# Patient Record
Sex: MALE | Race: WHITE | Employment: FULL TIME | URBAN - METROPOLITAN AREA
[De-identification: names, ages, dates, MRNs, and addresses within clinical notes are randomized per-mention and may not be internally consistent; named-entity substitution may affect disease eponyms.]

---

## 2018-08-19 ENCOUNTER — HOSPITAL ENCOUNTER (EMERGENCY)
Age: 28
Discharge: HOME | End: 2018-08-20
Attending: EMERGENCY MEDICINE
Payer: COMMERCIAL

## 2018-08-19 ENCOUNTER — APPOINTMENT (OUTPATIENT)
Dept: CT IMAGING | Age: 28
End: 2018-08-19
Attending: EMERGENCY MEDICINE
Payer: COMMERCIAL

## 2018-08-19 ENCOUNTER — APPOINTMENT (OUTPATIENT)
Dept: GENERAL RADIOLOGY | Age: 28
End: 2018-08-19
Attending: EMERGENCY MEDICINE
Payer: COMMERCIAL

## 2018-08-19 DIAGNOSIS — S22.41XA CLOSED FRACTURE OF MULTIPLE RIBS OF RIGHT SIDE, INITIAL ENCOUNTER: Primary | ICD-10-CM

## 2018-08-19 DIAGNOSIS — S27.321A CONTUSION OF RIGHT LUNG, INITIAL ENCOUNTER: ICD-10-CM

## 2018-08-19 LAB
ALBUMIN SERPL-MCNC: 4.6 G/DL (ref 3.5–5)
ALBUMIN/GLOB SERPL: 1.3 {RATIO} (ref 1.2–3.5)
ALP SERPL-CCNC: 83 U/L (ref 50–136)
ALT SERPL-CCNC: 50 U/L (ref 12–65)
ANION GAP SERPL CALC-SCNC: 11 MMOL/L (ref 7–16)
AST SERPL-CCNC: 40 U/L (ref 15–37)
BASOPHILS # BLD: 0 K/UL
BASOPHILS NFR BLD: 0 % (ref 0–2)
BILIRUB SERPL-MCNC: 0.8 MG/DL (ref 0.2–1.1)
BUN SERPL-MCNC: 20 MG/DL (ref 6–23)
CALCIUM SERPL-MCNC: 9 MG/DL (ref 8.3–10.4)
CHLORIDE SERPL-SCNC: 101 MMOL/L (ref 98–107)
CO2 SERPL-SCNC: 28 MMOL/L (ref 21–32)
CREAT SERPL-MCNC: 1.12 MG/DL (ref 0.8–1.5)
DIFFERENTIAL METHOD BLD: NORMAL
EOSINOPHIL # BLD: 0.4 K/UL
EOSINOPHIL NFR BLD: 4 % (ref 0.5–7.8)
ERYTHROCYTE [DISTWIDTH] IN BLOOD BY AUTOMATED COUNT: 12.2 %
GLOBULIN SER CALC-MCNC: 3.6 G/DL (ref 2.3–3.5)
GLUCOSE SERPL-MCNC: 89 MG/DL (ref 65–100)
HCT VFR BLD AUTO: 41.8 % (ref 41.1–50.3)
HGB BLD-MCNC: 14.4 G/DL (ref 13.6–17.2)
IMM GRANULOCYTES # BLD: 0 K/UL
IMM GRANULOCYTES NFR BLD AUTO: 0 % (ref 0–5)
LYMPHOCYTES # BLD: 2.9 K/UL
LYMPHOCYTES NFR BLD: 28 % (ref 13–44)
MCH RBC QN AUTO: 30.6 PG (ref 26.1–32.9)
MCHC RBC AUTO-ENTMCNC: 34.4 G/DL (ref 31.4–35)
MCV RBC AUTO: 88.9 FL (ref 79.6–97.8)
MONOCYTES # BLD: 1 K/UL
MONOCYTES NFR BLD: 9 % (ref 4–12)
NEUTS SEG # BLD: 6 K/UL
NEUTS SEG NFR BLD: 58 % (ref 43–78)
NRBC # BLD: 0 K/UL (ref 0–0.2)
PLATELET # BLD AUTO: 323 K/UL (ref 150–450)
PMV BLD AUTO: 9.8 FL (ref 9.4–12.3)
POTASSIUM SERPL-SCNC: 3.7 MMOL/L (ref 3.5–5.1)
PROT SERPL-MCNC: 8.2 G/DL (ref 6.3–8.2)
RBC # BLD AUTO: 4.7 M/UL (ref 4.23–5.6)
SODIUM SERPL-SCNC: 140 MMOL/L (ref 136–145)
WBC # BLD AUTO: 10.4 K/UL (ref 4.3–11.1)

## 2018-08-19 PROCEDURE — 71046 X-RAY EXAM CHEST 2 VIEWS: CPT

## 2018-08-19 PROCEDURE — 74011000258 HC RX REV CODE- 258: Performed by: EMERGENCY MEDICINE

## 2018-08-19 PROCEDURE — 74011636320 HC RX REV CODE- 636/320: Performed by: EMERGENCY MEDICINE

## 2018-08-19 PROCEDURE — 73030 X-RAY EXAM OF SHOULDER: CPT

## 2018-08-19 PROCEDURE — 85025 COMPLETE CBC W/AUTO DIFF WBC: CPT

## 2018-08-19 PROCEDURE — 99284 EMERGENCY DEPT VISIT MOD MDM: CPT | Performed by: EMERGENCY MEDICINE

## 2018-08-19 PROCEDURE — 93005 ELECTROCARDIOGRAM TRACING: CPT | Performed by: EMERGENCY MEDICINE

## 2018-08-19 PROCEDURE — 80053 COMPREHEN METABOLIC PANEL: CPT

## 2018-08-19 PROCEDURE — 71260 CT THORAX DX C+: CPT

## 2018-08-19 RX ORDER — SODIUM CHLORIDE 0.9 % (FLUSH) 0.9 %
10 SYRINGE (ML) INJECTION
Status: COMPLETED | OUTPATIENT
Start: 2018-08-19 | End: 2018-08-19

## 2018-08-19 RX ADMIN — SODIUM CHLORIDE 100 ML: 900 INJECTION, SOLUTION INTRAVENOUS at 23:50

## 2018-08-19 RX ADMIN — Medication 10 ML: at 23:50

## 2018-08-19 RX ADMIN — IOPAMIDOL 100 ML: 755 INJECTION, SOLUTION INTRAVENOUS at 23:50

## 2018-08-20 VITALS
RESPIRATION RATE: 18 BRPM | HEIGHT: 72 IN | DIASTOLIC BLOOD PRESSURE: 82 MMHG | SYSTOLIC BLOOD PRESSURE: 142 MMHG | WEIGHT: 228 LBS | OXYGEN SATURATION: 97 % | HEART RATE: 85 BPM | TEMPERATURE: 98.7 F | BODY MASS INDEX: 30.88 KG/M2

## 2018-08-20 LAB
ATRIAL RATE: 93 BPM
CALCULATED P AXIS, ECG09: 70 DEGREES
CALCULATED R AXIS, ECG10: 71 DEGREES
CALCULATED T AXIS, ECG11: 50 DEGREES
DIAGNOSIS, 93000: NORMAL
P-R INTERVAL, ECG05: 128 MS
Q-T INTERVAL, ECG07: 338 MS
QRS DURATION, ECG06: 88 MS
QTC CALCULATION (BEZET), ECG08: 420 MS
VENTRICULAR RATE, ECG03: 93 BPM

## 2018-08-20 RX ORDER — HYDROCODONE BITARTRATE AND ACETAMINOPHEN 5; 325 MG/1; MG/1
1 TABLET ORAL
Qty: 20 TAB | Refills: 0 | Status: SHIPPED | OUTPATIENT
Start: 2018-08-20
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
## 2020-09-15 PROBLEM — S42.022A CLOSED DISPLACED FRACTURE OF SHAFT OF LEFT CLAVICLE: Status: ACTIVE | Noted: 2020-09-15

## 2020-09-15 NOTE — BRIEF OP NOTE
BRIEF OPERATIVE NOTE    Date of Procedure: 9/17/2020     Preoperative Diagnosis:  CLOSED, COMMINUTED, DISPLACED MIDSHAFT LEFT CLAVICLE FRACTURE    Postoperative Diagnosis:  SAME    Procedure(s): OPEN REDUCTION, INTERNAL FIXATION LEFT CLAVICLE FRACTURE    Surgeon(s) and Role:     * Jen Corral MD - Primary         Assistant Staff:  Samara Banda NP      Surgical Staff:  Circ-1: (Unknown)  Scrub Tech-1: (Unknown)  Scrub Tech-2: (Unknown)  Scrub Tech-3: (Unknown)  No case tracking events are documented in the log. Anesthesia:  GENERAL WITH INTERSCALENE BLOCK    Estimated Blood Loss: 50 cc. Complications: NONE    Implants:   Implant Name Type Inv.  Item Serial No.  Lot No. LRB No. Used Action   PLATE BNE [id number] 8 H L SUP CLAV S STL ELISA COMPR FOR 3.5MM - [id number]  PLATE BNE [id number] 8 H L SUP CLAV S STL ELISA COMPR FOR 3.5MM [id number] [employer] [place]_ [id number] Left 1 Implanted       Rica Rice MD

## 2020-09-15 NOTE — H&P
[facility] HISTORY AND PHYSICAL    Subjective:     Patient is a 26 y.o. RHD MALE WITH LEFT SHOULDER PAIN. SEE OFFICE NOTE. Patient Active Problem List    Diagnosis Date Noted    Closed displaced fracture of shaft of left clavicle 09/15/2020    Bicipital tendinitis of right shoulder 12/15/2016    Degenerative joint disease of right acromioclavicular joint 12/15/2016    Osteoarthritis of right glenohumeral joint 12/15/2016    SLAP lesion of right shoulder 12/15/2016    Elevated BP 05/10/2015     Past Medical History:   Diagnosis Date    Adverse effect of anesthesia     \"told possible poss sleep apnea after last surgery\"    Chronic pain     fx R patella    Degenerative joint disease of right acromioclavicular joint 12/15/2016    Obesity (BMI 30.0-34.9) 5/7/15    BMI- 30.4    Snores       Past Surgical History:   Procedure Laterality Date    HX ORTHOPAEDIC Left     fx wrist- later had wires taken out    HX ORTHOPAEDIC Left     labrium    HX ORTHOPAEDIC Right     thumb tendons hand    HX ORTHOPAEDIC Right 07/08/2016    REMOVAL OF DEEP HARDWARE/RIGHT EXCISION HETEROTROPIC OSSIFICATION  RIGHT PATELLA  OSTEOTOMY. Prior to Admission medications    Medication Sig Start Date End Date Taking? Authorizing Provider   HYDROcodone-acetaminophen (NORCO) 5-325 mg per tablet Take 1 Tab by mouth every four (4) hours as needed for Pain. Max Daily Amount: 6 Tabs. 8/20/18   Neno Mccullough MD   diclofenac (VOLTAREN) 1 % gel Apply  to affected area as needed. Provider, Historical     No Known Allergies   Social History     Tobacco Use    Smoking status: Never Smoker    Smokeless tobacco: Never Used   Substance Use Topics    Alcohol use: Yes     Alcohol/week: 5.0 standard drinks     Types: 5 Cans of beer per week      No family history on file. Review of Systems  A comprehensive review of systems was negative except for that written in the HPI. Objective:     No data found.   There were no vitals taken for this visit. General:  Alert, cooperative, no distress, appears stated age. Head:  Normocephalic, without obvious abnormality, atraumatic. Back:   Symmetric, no curvature. ROM normal. No CVA tenderness. Lungs:   Clear to auscultation bilaterally. Chest wall:  No tenderness or deformity. Heart:  Regular rate and rhythm, S1, S2 normal, no murmur, click, rub or gallop. Extremities: Extremities normal, atraumatic, no cyanosis or edema. Pulses: 2+ and symmetric all extremities. Skin: Skin color, texture, turgor normal. No rashes or lesions   Lymph nodes: Cervical, supraclavicular, and axillary nodes normal.   Neurologic: CNII-XII intact. Normal strength, sensation and reflexes throughout. Assessment:     Principal Problem:    Closed displaced fracture of shaft of left clavicle (9/15/2020)        Plan:     The various methods of treatment have been discussed with the patient and family. PATIENT HAS EXHAUSTED NON-OPERATIVE MODALITIES     After consideration of risks, benefits and other options for treatment, the patient has consented to surgical intervention.     SEE OFFICE NOTE    Marcie Garcia MD

## 2020-09-16 ENCOUNTER — HOSPITAL ENCOUNTER (OUTPATIENT)
Dept: SURGERY | Age: 30
Discharge: HOME | End: 2020-09-16

## 2020-09-16 ENCOUNTER — ANESTHESIA EVENT (OUTPATIENT)
Dept: SURGERY | Age: 30
End: 2020-09-16
Payer: COMMERCIAL

## 2020-09-16 VITALS — HEIGHT: 72 IN | BODY MASS INDEX: 30.48 KG/M2 | WEIGHT: 225 LBS

## 2020-09-16 RX ORDER — TRAMADOL HYDROCHLORIDE 50 MG/1
50 TABLET ORAL
COMMUNITY

## 2020-09-16 RX ORDER — IBUPROFEN 800 MG/1
800 TABLET ORAL 2 TIMES DAILY
COMMUNITY

## 2020-09-16 NOTE — PERIOP NOTES
The patient and family have been mailed the information provided by [facility] in regards to resuming surgery during COVID-19. The patient and family have been informed of [facility] procedures to minimize COVID-19 related risk, but that elimination of risk is not possible. The patient and family have been informed of the visitation policy during their surgery - that one visitor is allowed to be with the patient. The patient has been advised to maintain the stay at home order for two weeks prior and two weeks after surgery. The patient and family have been educated to monitor symptoms such as fever, cough (dry or productive), shortness of breath, difficulty breathing, sore throat, laryngitis, headache, fatigue, unexplained soreness, diarrhea, nausea and sudden loss of taste or smell. They have been advised if the patient develops any of these symptoms, they are to contact our office to review and possibly reschedule their surgery. This was discussed in the office with the patient on 9/15/20.

## 2020-09-16 NOTE — PERIOP NOTES
Patient verified name and . Order for consent was found in EHR and matches case posting; patient verifies procedure. Type 1b surgery, PAT phone assessment complete. Orders not received. Labs per surgeon: none  Labs per anesthesia protocol: none      Patient answered medical/surgical history questions at their best of ability. All prior to admission medications documented in [facility]. Patient instructed to take the following medications the day of surgery according to anesthesia guidelines with a small sip of water:Tramadol if needed Hold all vitamins 7 days prior to surgery and NSAIDS 5 days prior to surgery. Prescription meds to hold:Ibuprofen    Patient instructed on the following:    > a negative Covid swab result is required to proceed with surgery;  appointments are made by the surgeon office and test should be collected 7 days prior to surgery. The testing center is located at the [address] [place] [address].   > 1 visitor allowed at this time. >Arrive at [facility], time of arrival to be called the day before by 1700  >NPO after midnight including gum, mints, and ice chips  >Responsible adult must drive patient to the hospital, stay during surgery, and patient will need supervision 24 hours after anesthesia  >Use antibacterial soap in shower the night before surgery and Hibiclens packet provided by Dr. Dhara Quan office on the morning of surgery  >All piercings must be removed prior to arrival.    >Leave all valuables (money and jewelry) at home but bring insurance card and ID on       DOS. >Do not wear make-up, nail polish, lotions, cologne, perfumes, powders, or oil on skin.

## 2020-09-17 ENCOUNTER — ANESTHESIA (OUTPATIENT)
Dept: SURGERY | Age: 30
End: 2020-09-17
Payer: COMMERCIAL

## 2020-09-17 ENCOUNTER — APPOINTMENT (OUTPATIENT)
Dept: GENERAL RADIOLOGY | Age: 30
End: 2020-09-17
Attending: ORTHOPAEDIC SURGERY
Payer: COMMERCIAL

## 2020-09-17 ENCOUNTER — HOSPITAL ENCOUNTER (OUTPATIENT)
Age: 30
Setting detail: OUTPATIENT SURGERY
Discharge: HOME | End: 2020-09-17
Attending: ORTHOPAEDIC SURGERY | Admitting: ORTHOPAEDIC SURGERY
Payer: COMMERCIAL

## 2020-09-17 VITALS
HEART RATE: 88 BPM | BODY MASS INDEX: 31.04 KG/M2 | RESPIRATION RATE: 16 BRPM | SYSTOLIC BLOOD PRESSURE: 132 MMHG | DIASTOLIC BLOOD PRESSURE: 64 MMHG | OXYGEN SATURATION: 94 % | WEIGHT: 229.19 LBS | TEMPERATURE: 98.3 F | HEIGHT: 72 IN

## 2020-09-17 PROCEDURE — C1713 ANCHOR/SCREW BN/BN,TIS/BN: HCPCS | Performed by: ORTHOPAEDIC SURGERY

## 2020-09-17 PROCEDURE — 76010000171 HC OR TIME 2 TO 2.5 HR INTENSV-TIER 1: Performed by: ORTHOPAEDIC SURGERY

## 2020-09-17 PROCEDURE — 77030040361 HC SLV COMPR DVT MDII -B: Performed by: ORTHOPAEDIC SURGERY

## 2020-09-17 PROCEDURE — 74011000250 HC RX REV CODE- 250: Performed by: ANESTHESIOLOGY

## 2020-09-17 PROCEDURE — 77030040922 HC BLNKT HYPOTHRM STRY -A: Performed by: ANESTHESIOLOGY

## 2020-09-17 PROCEDURE — 77030025102 HC BIT DRL QC SS 2 SYNT -B: Performed by: ORTHOPAEDIC SURGERY

## 2020-09-17 PROCEDURE — A4565 SLINGS: HCPCS | Performed by: ORTHOPAEDIC SURGERY

## 2020-09-17 PROCEDURE — 77030003862 HC BIT DRL SYNT -B: Performed by: ORTHOPAEDIC SURGERY

## 2020-09-17 PROCEDURE — 74011250636 HC RX REV CODE- 250/636: Performed by: ANESTHESIOLOGY

## 2020-09-17 PROCEDURE — 76942 ECHO GUIDE FOR BIOPSY: CPT | Performed by: ORTHOPAEDIC SURGERY

## 2020-09-17 PROCEDURE — 74011000250 HC RX REV CODE- 250: Performed by: NURSE ANESTHETIST, CERTIFIED REGISTERED

## 2020-09-17 PROCEDURE — 76210000020 HC REC RM PH II FIRST 0.5 HR: Performed by: ORTHOPAEDIC SURGERY

## 2020-09-17 PROCEDURE — 77030014058 HC TIP IRR PULSVC ZIMM -A: Performed by: ORTHOPAEDIC SURGERY

## 2020-09-17 PROCEDURE — 76060000035 HC ANESTHESIA 2 TO 2.5 HR: Performed by: ORTHOPAEDIC SURGERY

## 2020-09-17 PROCEDURE — 77030003602 HC NDL NRV BLK BBMI -B: Performed by: ANESTHESIOLOGY

## 2020-09-17 PROCEDURE — 74011250636 HC RX REV CODE- 250/636: Performed by: NURSE ANESTHETIST, CERTIFIED REGISTERED

## 2020-09-17 PROCEDURE — 77030029637: Performed by: ORTHOPAEDIC SURGERY

## 2020-09-17 PROCEDURE — 2709999900 HC NON-CHARGEABLE SUPPLY: Performed by: ORTHOPAEDIC SURGERY

## 2020-09-17 PROCEDURE — 74011250637 HC RX REV CODE- 250/637: Performed by: ANESTHESIOLOGY

## 2020-09-17 PROCEDURE — 77030013107 HC CUF CRYOCUF DJOR -B: Performed by: ORTHOPAEDIC SURGERY

## 2020-09-17 PROCEDURE — 77030002986 HC SUT PROL J&J -A: Performed by: ORTHOPAEDIC SURGERY

## 2020-09-17 PROCEDURE — 77030039425 HC BLD LARYNG TRULITE DISP TELE -A: Performed by: ANESTHESIOLOGY

## 2020-09-17 PROCEDURE — 77030013708 HC HNDPC SUC IRR PULS STRY –B: Performed by: ORTHOPAEDIC SURGERY

## 2020-09-17 PROCEDURE — 77030031139 HC SUT VCRL2 J&J -A: Performed by: ORTHOPAEDIC SURGERY

## 2020-09-17 PROCEDURE — 76210000063 HC OR PH I REC FIRST 0.5 HR: Performed by: ORTHOPAEDIC SURGERY

## 2020-09-17 PROCEDURE — 77030020268 HC MISC GENERAL SUPPLY: Performed by: ORTHOPAEDIC SURGERY

## 2020-09-17 PROCEDURE — 73000 X-RAY EXAM OF COLLAR BONE: CPT

## 2020-09-17 PROCEDURE — 77030020163 HC SEAL HEMSTAT HALY -B: Performed by: ORTHOPAEDIC SURGERY

## 2020-09-17 PROCEDURE — 77030037088 HC TUBE ENDOTRACH ORAL NSL COVD-A: Performed by: ANESTHESIOLOGY

## 2020-09-17 PROCEDURE — 76010010054 HC POST OP PAIN BLOCK: Performed by: ORTHOPAEDIC SURGERY

## 2020-09-17 PROCEDURE — 74011250636 HC RX REV CODE- 250/636: Performed by: NURSE PRACTITIONER

## 2020-09-17 PROCEDURE — 77030011283 HC ELECTRD NDL COVD -A: Performed by: ORTHOPAEDIC SURGERY

## 2020-09-17 DEVICE — SCREW BNE L22MM DIA3.5MM CORT S STL ST NONCANNULATED LOK: Type: IMPLANTABLE DEVICE | Site: CLAVICLE | Status: FUNCTIONAL

## 2020-09-17 DEVICE — SCREW BNE L18MM DIA3.5MM CORT S STL ST LOK FULL THRD: Type: IMPLANTABLE DEVICE | Site: CLAVICLE | Status: FUNCTIONAL

## 2020-09-17 DEVICE — IMPLANTABLE DEVICE: Type: IMPLANTABLE DEVICE | Site: CLAVICLE | Status: FUNCTIONAL

## 2020-09-17 DEVICE — SCREW BNE L20MM DIA3.5MM CORT S STL ST NONCANNULATED LOK: Type: IMPLANTABLE DEVICE | Site: CLAVICLE | Status: FUNCTIONAL

## 2020-09-17 DEVICE — SCREW BNE L20MM DIA3.5MM CORT S STL ST LOK FULL THRD: Type: IMPLANTABLE DEVICE | Site: CLAVICLE | Status: FUNCTIONAL

## 2020-09-17 RX ORDER — BUPIVACAINE HYDROCHLORIDE AND EPINEPHRINE 5; 5 MG/ML; UG/ML
INJECTION, SOLUTION EPIDURAL; INTRACAUDAL; PERINEURAL
Status: COMPLETED | OUTPATIENT
Start: 2020-09-17 | End: 2020-09-17

## 2020-09-17 RX ORDER — HALOPERIDOL 5 MG/ML
1 INJECTION INTRAMUSCULAR
Status: DISCONTINUED | OUTPATIENT
Start: 2020-09-17 | End: 2020-09-17 | Stop reason: HOSPADM

## 2020-09-17 RX ORDER — FENTANYL CITRATE 50 UG/ML
100 INJECTION, SOLUTION INTRAMUSCULAR; INTRAVENOUS ONCE
Status: COMPLETED | OUTPATIENT
Start: 2020-09-17 | End: 2020-09-17

## 2020-09-17 RX ORDER — PROPOFOL 10 MG/ML
INJECTION, EMULSION INTRAVENOUS AS NEEDED
Status: DISCONTINUED | OUTPATIENT
Start: 2020-09-17 | End: 2020-09-17 | Stop reason: HOSPADM

## 2020-09-17 RX ORDER — LIDOCAINE HYDROCHLORIDE 20 MG/ML
INJECTION, SOLUTION EPIDURAL; INFILTRATION; INTRACAUDAL; PERINEURAL AS NEEDED
Status: DISCONTINUED | OUTPATIENT
Start: 2020-09-17 | End: 2020-09-17 | Stop reason: HOSPADM

## 2020-09-17 RX ORDER — CEFAZOLIN SODIUM/WATER 2 G/20 ML
2 SYRINGE (ML) INTRAVENOUS ONCE
Status: COMPLETED | OUTPATIENT
Start: 2020-09-17 | End: 2020-09-17

## 2020-09-17 RX ORDER — NALOXONE HYDROCHLORIDE 0.4 MG/ML
0.2 INJECTION, SOLUTION INTRAMUSCULAR; INTRAVENOUS; SUBCUTANEOUS AS NEEDED
Status: DISCONTINUED | OUTPATIENT
Start: 2020-09-17 | End: 2020-09-17 | Stop reason: HOSPADM

## 2020-09-17 RX ORDER — MIDAZOLAM HYDROCHLORIDE 1 MG/ML
2 INJECTION, SOLUTION INTRAMUSCULAR; INTRAVENOUS ONCE
Status: COMPLETED | OUTPATIENT
Start: 2020-09-17 | End: 2020-09-17

## 2020-09-17 RX ORDER — MIDAZOLAM HYDROCHLORIDE 1 MG/ML
2 INJECTION, SOLUTION INTRAMUSCULAR; INTRAVENOUS
Status: DISCONTINUED | OUTPATIENT
Start: 2020-09-17 | End: 2020-09-17 | Stop reason: HOSPADM

## 2020-09-17 RX ORDER — NALOXONE HYDROCHLORIDE 0.4 MG/ML
0.2 INJECTION, SOLUTION INTRAMUSCULAR; INTRAVENOUS; SUBCUTANEOUS
Status: DISCONTINUED | OUTPATIENT
Start: 2020-09-17 | End: 2020-09-17 | Stop reason: HOSPADM

## 2020-09-17 RX ORDER — SODIUM CHLORIDE 0.9 % (FLUSH) 0.9 %
5-40 SYRINGE (ML) INJECTION EVERY 8 HOURS
Status: DISCONTINUED | OUTPATIENT
Start: 2020-09-17 | End: 2020-09-17 | Stop reason: HOSPADM

## 2020-09-17 RX ORDER — SODIUM CHLORIDE, SODIUM LACTATE, POTASSIUM CHLORIDE, CALCIUM CHLORIDE 600; 310; 30; 20 MG/100ML; MG/100ML; MG/100ML; MG/100ML
75 INJECTION, SOLUTION INTRAVENOUS CONTINUOUS
Status: DISCONTINUED | OUTPATIENT
Start: 2020-09-17 | End: 2020-09-17 | Stop reason: HOSPADM

## 2020-09-17 RX ORDER — SODIUM CHLORIDE, SODIUM LACTATE, POTASSIUM CHLORIDE, CALCIUM CHLORIDE 600; 310; 30; 20 MG/100ML; MG/100ML; MG/100ML; MG/100ML
25 INJECTION, SOLUTION INTRAVENOUS CONTINUOUS
Status: DISCONTINUED | OUTPATIENT
Start: 2020-09-17 | End: 2020-09-17 | Stop reason: HOSPADM

## 2020-09-17 RX ORDER — DEXAMETHASONE SODIUM PHOSPHATE 4 MG/ML
INJECTION, SOLUTION INTRA-ARTICULAR; INTRALESIONAL; INTRAMUSCULAR; INTRAVENOUS; SOFT TISSUE
Status: COMPLETED | OUTPATIENT
Start: 2020-09-17 | End: 2020-09-17

## 2020-09-17 RX ORDER — HYDROMORPHONE HYDROCHLORIDE 2 MG/ML
0.5 INJECTION, SOLUTION INTRAMUSCULAR; INTRAVENOUS; SUBCUTANEOUS
Status: DISCONTINUED | OUTPATIENT
Start: 2020-09-17 | End: 2020-09-17 | Stop reason: HOSPADM

## 2020-09-17 RX ORDER — SUCCINYLCHOLINE CHLORIDE 20 MG/ML
INJECTION INTRAMUSCULAR; INTRAVENOUS AS NEEDED
Status: DISCONTINUED | OUTPATIENT
Start: 2020-09-17 | End: 2020-09-17 | Stop reason: HOSPADM

## 2020-09-17 RX ORDER — ACETAMINOPHEN 500 MG
1000 TABLET ORAL ONCE
Status: COMPLETED | OUTPATIENT
Start: 2020-09-17 | End: 2020-09-17

## 2020-09-17 RX ORDER — LIDOCAINE HYDROCHLORIDE 10 MG/ML
0.1 INJECTION INFILTRATION; PERINEURAL AS NEEDED
Status: DISCONTINUED | OUTPATIENT
Start: 2020-09-17 | End: 2020-09-17 | Stop reason: HOSPADM

## 2020-09-17 RX ORDER — SODIUM CHLORIDE 0.9 % (FLUSH) 0.9 %
5-40 SYRINGE (ML) INJECTION AS NEEDED
Status: DISCONTINUED | OUTPATIENT
Start: 2020-09-17 | End: 2020-09-17 | Stop reason: HOSPADM

## 2020-09-17 RX ORDER — ONDANSETRON 2 MG/ML
INJECTION INTRAMUSCULAR; INTRAVENOUS AS NEEDED
Status: DISCONTINUED | OUTPATIENT
Start: 2020-09-17 | End: 2020-09-17 | Stop reason: HOSPADM

## 2020-09-17 RX ORDER — OXYCODONE HYDROCHLORIDE 5 MG/1
5 TABLET ORAL
Status: DISCONTINUED | OUTPATIENT
Start: 2020-09-17 | End: 2020-09-17 | Stop reason: HOSPADM

## 2020-09-17 RX ORDER — ROCURONIUM BROMIDE 10 MG/ML
INJECTION, SOLUTION INTRAVENOUS AS NEEDED
Status: DISCONTINUED | OUTPATIENT
Start: 2020-09-17 | End: 2020-09-17 | Stop reason: HOSPADM

## 2020-09-17 RX ORDER — ONDANSETRON 2 MG/ML
4 INJECTION INTRAMUSCULAR; INTRAVENOUS
Status: DISCONTINUED | OUTPATIENT
Start: 2020-09-17 | End: 2020-09-17 | Stop reason: HOSPADM

## 2020-09-17 RX ADMIN — LIDOCAINE HYDROCHLORIDE 0.1 ML: 10 INJECTION, SOLUTION INFILTRATION; PERINEURAL at 05:40

## 2020-09-17 RX ADMIN — ONDANSETRON 4 MG: 2 INJECTION INTRAMUSCULAR; INTRAVENOUS at 08:27

## 2020-09-17 RX ADMIN — FENTANYL CITRATE 100 MCG: 50 INJECTION INTRAMUSCULAR; INTRAVENOUS at 06:35

## 2020-09-17 RX ADMIN — ROCURONIUM BROMIDE 10 MG: 10 INJECTION, SOLUTION INTRAVENOUS at 06:55

## 2020-09-17 RX ADMIN — SUCCINYLCHOLINE CHLORIDE 180 MG: 20 INJECTION, SOLUTION INTRAMUSCULAR; INTRAVENOUS at 06:55

## 2020-09-17 RX ADMIN — DEXAMETHASONE SODIUM PHOSPHATE 5 MG: 4 INJECTION, SOLUTION INTRAMUSCULAR; INTRAVENOUS at 06:37

## 2020-09-17 RX ADMIN — PROPOFOL 250 MG: 10 INJECTION, EMULSION INTRAVENOUS at 06:55

## 2020-09-17 RX ADMIN — BUPIVACAINE HYDROCHLORIDE AND EPINEPHRINE 20 ML: 5; 5 INJECTION, SOLUTION EPIDURAL; INTRACAUDAL; PERINEURAL at 06:37

## 2020-09-17 RX ADMIN — ACETAMINOPHEN 1000 MG: 500 TABLET, FILM COATED ORAL at 05:36

## 2020-09-17 RX ADMIN — LIDOCAINE HYDROCHLORIDE 100 MG: 20 INJECTION, SOLUTION EPIDURAL; INFILTRATION; INTRACAUDAL; PERINEURAL at 06:55

## 2020-09-17 RX ADMIN — SODIUM CHLORIDE, SODIUM LACTATE, POTASSIUM CHLORIDE, AND CALCIUM CHLORIDE 25 ML/HR: 600; 310; 30; 20 INJECTION, SOLUTION INTRAVENOUS at 05:40

## 2020-09-17 RX ADMIN — Medication 2 G: at 07:05

## 2020-09-17 RX ADMIN — MIDAZOLAM 2 MG: 1 INJECTION INTRAMUSCULAR; INTRAVENOUS at 06:35

## 2020-09-17 NOTE — H&P
Date of Surgery Update:  Yahir Louis was seen and examined. History and physical has been reviewed. The patient has been examined.  There have been no significant clinical changes since the completion of the originally dated History and Physical.    Signed By: Abi Mason MD     September 17, 2020 6:30 AM

## 2020-09-17 NOTE — ANESTHESIA POSTPROCEDURE EVALUATION
Procedure(s):  OPEN REDUCTION, INTERNAL FIXATION LEFT CLAVICLE FRACTURE.     general    Anesthesia Post Evaluation      Multimodal analgesia: multimodal analgesia used between 6 hours prior to anesthesia start to PACU discharge  Patient location during evaluation: PACU  Patient participation: complete - patient participated  Level of consciousness: awake and awake and alert  Pain management: adequate  Airway patency: patent  Anesthetic complications: no  Cardiovascular status: acceptable  Respiratory status: acceptable  Hydration status: acceptable  Post anesthesia nausea and vomiting:  controlled      INITIAL Post-op Vital signs:   Vitals Value Taken Time   /58 9/17/2020  9:10 AM   Temp 36.8 °C (98.3 °F) 9/17/2020  9:03 AM   Pulse 96 9/17/2020  9:10 AM   Resp 16 9/17/2020  9:10 AM   SpO2 94 % 9/17/2020  9:10 AM

## 2020-09-17 NOTE — ANESTHESIA PREPROCEDURE EVALUATION
Anesthetic History   No history of anesthetic complications            Review of Systems / Medical History  Patient summary reviewed and pertinent labs reviewed    Pulmonary  Within defined limits                 Neuro/Psych   Within defined limits           Cardiovascular                  Exercise tolerance: >4 METS  Comments: Denies CV history   GI/Hepatic/Renal  Within defined limits              Endo/Other        Obesity and arthritis     Other Findings              Physical Exam    Airway  Mallampati: II  TM Distance: 4 - 6 cm  Neck ROM: normal range of motion   Mouth opening: Normal     Cardiovascular    Rhythm: regular  Rate: normal         Dental  No notable dental hx       Pulmonary  Breath sounds clear to auscultation               Abdominal  GI exam deferred       Other Findings            Anesthetic Plan    ASA: 2  Anesthesia type: general      Post-op pain plan if not by surgeon: peripheral nerve block single    Induction: Intravenous  Anesthetic plan and risks discussed with: Patient

## 2020-09-17 NOTE — DISCHARGE INSTRUCTIONS
Follow instructions given by Dr. Lenin Paulino office, they will call you tomorrow. Call their office with any questions or concerns. ACTIVITY  · As tolerated and as directed by your doctor. · Bathe or shower as directed by your doctor. DIET  · Clear liquids until no nausea or vomiting; then light diet for the first day. · Advance to regular diet on second day, unless your doctor orders otherwise. · If nausea and vomiting continues, call your doctor. PAIN  · Take pain medication as directed by your doctor. · Call your doctor if pain is NOT relieved by medication. · DO NOT take aspirin of blood thinners unless directed by your doctor. CALL YOUR DOCTOR IF   · Excessive bleeding that does not stop after holding pressure over the area  · Temperature of 101 degrees F or above  · Excessive redness, swelling or bruising, and/ or green or yellow, smelly discharge from incision    AFTER ANESTHESIA   · For the first 24 hours: DO NOT Drive, Drink alcoholic beverages, or Make important decisions. · Be aware of dizziness following anesthesia and while taking pain medication. After general anesthesia or intravenous sedation, for 24 hours or while taking prescription Narcotics:  · Limit your activities  · A responsible adult needs to be with you for the next 24 hours  · Do not drive and operate hazardous machinery  · Do not make important personal or business decisions  · Do not drink alcoholic beverages  · If you have not urinated within 8 hours after discharge, please contact your surgeon on call. · If you have sleep apnea and have a CPAP machine, please use it for all naps and sleeping. · Please use caution when taking narcotics and any of your home medications that may cause drowsiness. *  Please give a list of your current medications to your Primary Care Provider.   *  Please update this list whenever your medications are discontinued, doses are      changed, or new medications (including over-the-counter products) are added. *  Please carry medication information at all times in case of emergency situations. These are general instructions for a healthy lifestyle:  No smoking/ No tobacco products/ Avoid exposure to second hand smoke  Surgeon General's Warning:  Quitting smoking now greatly reduces serious risk to your health. Obesity, smoking, and sedentary lifestyle greatly increases your risk for illness  A healthy diet, regular physical exercise & weight monitoring are important for maintaining a healthy lifestyle    You may be retaining fluid if you have a history of heart failure or if you experience any of the following symptoms:  Weight gain of 3 pounds or more overnight or 5 pounds in a week, increased swelling in our hands or feet or shortness of breath while lying flat in bed. Please call your doctor as soon as you notice any of these symptoms; do not wait until your next office visit.
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
## 2020-09-17 NOTE — ANESTHESIA PROCEDURE NOTES
Peripheral Block    Start time: 9/17/2020 6:34 AM  End time: 9/17/2020 6:37 AM  Performed by: Ruby Mcbride MD  Authorized by: Ruby Mcbride MD       Pre-procedure: Indications: at surgeon's request and post-op pain management    Preanesthetic Checklist: patient identified, risks and benefits discussed, site marked, timeout performed, anesthesia consent given and patient being monitored    Timeout Time: 06:34          Block Type:   Block Type:   Interscalene  Laterality:  Left  Monitoring:  Standard ASA monitoring, responsive to questions, continuous pulse ox, oxygen, frequent vital sign checks and heart rate  Injection Technique:  Single shot  Procedures: ultrasound guided and nerve stimulator    Patient Position: supine  Prep: chlorhexidine    Location:  Interscalene  Needle Type:  Stimuplex  Needle Gauge:  22 G  Needle Localization:  Nerve stimulator and ultrasound guidance  Motor Response: minimal motor response >0.4 mA      Assessment:  Number of attempts:  1  Injection Assessment:  Incremental injection every 5 mL, negative aspiration for CSF, ultrasound image on chart, no paresthesia, local visualized surrounding nerve on ultrasound, negative aspiration for blood and no intravascular symptoms  Patient tolerance:  Patient tolerated the procedure well with no immediate complications

## 2022-03-20 PROBLEM — S42.022A CLOSED DISPLACED FRACTURE OF SHAFT OF LEFT CLAVICLE: Status: ACTIVE | Noted: 2020-09-15

## 2022-09-15 ENCOUNTER — TELEPHONE (OUTPATIENT)
Dept: ORTHOPEDIC SURGERY | Age: 32
End: 2022-09-15

## 2022-09-15 NOTE — TELEPHONE ENCOUNTER
Called and spoke with patient. Patient got in sooner elsewhere. Informed the patient to call our office if need anything further.
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
## 2025-07-09 ENCOUNTER — TELEPHONE (OUTPATIENT)
Dept: ORTHOPEDIC SURGERY | Age: 35
End: 2025-07-09

## 2025-07-10 ENCOUNTER — TELEPHONE (OUTPATIENT)
Dept: ORTHOPEDIC SURGERY | Age: 35
End: 2025-07-10

## (undated) DEVICE — HIGH CAPACITY FAN SPRAY TIP WITH SHIELD: Brand: PULSAVAC®

## (undated) DEVICE — SLING ARM SWTH UNIV FOAM 1 SZ FITS MOST

## (undated) DEVICE — TOTAL SHOULDER DR POSTA: Brand: MEDLINE INDUSTRIES, INC.

## (undated) DEVICE — XBRAID #5

## (undated) DEVICE — DRAPE SHT 3 QTR PROXIMA 53X77 --

## (undated) DEVICE — 3M™ STERI-DRAPE™ INCISE DRAPE 1050 (60CM X 45CM): Brand: STERI-DRAPE™

## (undated) DEVICE — HANDPIECE SET WITH COAXIAL HIGH FLOW TIP AND SUCTION TUBE: Brand: INTERPULSE

## (undated) DEVICE — CUFF THER CRYO UNIV 32-48 IN FOR 81-122CM CHST CIRC AIRCAST

## (undated) DEVICE — DRAPE,TOP,102X53,STERILE: Brand: MEDLINE

## (undated) DEVICE — SUTURE VCRL SZ 0 L27IN ABSRB UD L36MM CP-1 1/2 CIR REV CUT J267H

## (undated) DEVICE — BUTTON SWITCH PENCIL BLADE ELECTRODE, HOLSTER: Brand: EDGE

## (undated) DEVICE — XBRAID #2

## (undated) DEVICE — 3.5MM CORTEX SCREW SELF-TAPPING 18MM
Type: IMPLANTABLE DEVICE | Site: CLAVICLE | Status: NON-FUNCTIONAL
Removed: 2020-09-17

## (undated) DEVICE — PAD,ABDOMINAL,5"X9",ST,LF,25/BX: Brand: MEDLINE INDUSTRIES, INC.

## (undated) DEVICE — ABDOMINAL PAD: Brand: DERMACEA

## (undated) DEVICE — DRAPE TWL SURG 16X26IN BLU ORB04] ALLCARE INC]

## (undated) DEVICE — SUTURE PROL SZ 2-0 L18IN NONABSORBABLE BLU FS L26MM 3/8 CIR 8685H

## (undated) DEVICE — PLATE BNE L137MM THK3.4MM 10 H BILAT S STL STR LOK COMPR
Type: IMPLANTABLE DEVICE | Site: CLAVICLE | Status: NON-FUNCTIONAL
Removed: 2020-09-17

## (undated) DEVICE — STRIP,CLOSURE,WOUND,MEDI-STRIP,1/2X4: Brand: MEDLINE

## (undated) DEVICE — BIT DRL DIA2.8MM SHT CALIB QUIK CPL W/ STP PRO-PAK

## (undated) DEVICE — IMPLANTABLE DEVICE
Type: IMPLANTABLE DEVICE | Site: CLAVICLE | Status: NON-FUNCTIONAL
Removed: 2020-09-17

## (undated) DEVICE — CARDINAL HEALTH FLEXIBLE LIGHT HANDLE COVER: Brand: CARDINAL HEALTH

## (undated) DEVICE — GARMENT,MEDLINE,DVT,INT,CALF,MED, GEN2: Brand: MEDLINE

## (undated) DEVICE — SCREW BNE L10MM DIA3.5MM CORT S STL ST NONCANNULATED LOK
Type: IMPLANTABLE DEVICE | Site: CLAVICLE | Status: NON-FUNCTIONAL
Removed: 2020-09-17

## (undated) DEVICE — SUTURE PROL SZ 0 L30IN NONABSORBABLE BLU FSLX L36MM 3/8 CIR 8690H

## (undated) DEVICE — STOCKINETTE TUBE 6X48 -- MEDICHOICE

## (undated) DEVICE — BIT DRL L180MM DIA2.5MM G QUIK CPL W/O STP REUSE

## (undated) DEVICE — BIT DRL RMFG 3.5X110MM DISP --

## (undated) DEVICE — SHEET, DRAPE, SPLIT, STERILE: Brand: MEDLINE

## (undated) DEVICE — SOLUTION IRRIG 3000ML 0.9% SOD CHL FLX CONT 0797208] ICU MEDICAL INC]

## (undated) DEVICE — INTEGUSEAL MICROBIAL SEALANT: Brand: AVANOS

## (undated) DEVICE — (D)PREP SKN CHLRAPRP APPL 26ML -- CONVERT TO ITEM 371833

## (undated) DEVICE — GAUZE,SPONGE,4"X4",16PLY,STRL,LF,10/TRAY: Brand: MEDLINE

## (undated) DEVICE — 3M™ STERI-DRAPE™ INSTRUMENT POUCH 1018: Brand: STERI-DRAPE™

## (undated) DEVICE — ELECTRODE NDL 2.8IN COAT VALLEYLAB

## (undated) DEVICE — GOWN,PREVENTION PLUS,2XL,ST,22/CS: Brand: MEDLINE